# Patient Record
Sex: FEMALE | Race: WHITE | NOT HISPANIC OR LATINO | Employment: STUDENT | URBAN - METROPOLITAN AREA
[De-identification: names, ages, dates, MRNs, and addresses within clinical notes are randomized per-mention and may not be internally consistent; named-entity substitution may affect disease eponyms.]

---
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
## 2021-01-25 NOTE — TELEPHONE ENCOUNTER
Received a request from [id number] in [place], they need a new script ASAP  Please fax to [phone]  No. HELEN screening performed.  STOP BANG Legend: 0-2 = LOW Risk; 3-4 = INTERMEDIATE Risk; 5-8 = HIGH Risk

## 2021-05-10 ENCOUNTER — TELEPHONE (OUTPATIENT)
Dept: PEDIATRICS CLINIC | Age: 21
End: 2021-05-10

## 2021-05-10 NOTE — TELEPHONE ENCOUNTER
Please remove dr Dori Hernandez as [facility] Medina pcp  She now goes to see dr Coral Reeder  Thank you!

## 2021-06-03 NOTE — TELEPHONE ENCOUNTER
06/03/21 9:45 AM     Thank you for your request  Your request has been received, reviewed, and the patient chart updated  The PCP has successfully been removed with a patient attribution note  This message will now be completed      Thank you  Lacy Up

## 2022-04-30 ENCOUNTER — APPOINTMENT (EMERGENCY)
Dept: CT IMAGING | Facility: HOSPITAL | Age: 22
End: 2022-04-30
Payer: COMMERCIAL

## 2022-04-30 ENCOUNTER — HOSPITAL ENCOUNTER (EMERGENCY)
Facility: HOSPITAL | Age: 22
Discharge: HOME | End: 2022-04-30
Attending: EMERGENCY MEDICINE | Admitting: EMERGENCY MEDICINE
Payer: COMMERCIAL

## 2022-04-30 VITALS
OXYGEN SATURATION: 98 % | SYSTOLIC BLOOD PRESSURE: 123 MMHG | RESPIRATION RATE: 12 BRPM | BODY MASS INDEX: 27.42 KG/M2 | HEART RATE: 84 BPM | DIASTOLIC BLOOD PRESSURE: 73 MMHG | WEIGHT: 149.91 LBS | TEMPERATURE: 97.8 F

## 2022-04-30 DIAGNOSIS — R51.9 HEADACHE: Primary | ICD-10-CM

## 2022-04-30 PROCEDURE — 70450 CT HEAD/BRAIN W/O DYE: CPT

## 2022-04-30 PROCEDURE — 99284 EMERGENCY DEPT VISIT MOD MDM: CPT | Performed by: EMERGENCY MEDICINE

## 2022-04-30 PROCEDURE — 99284 EMERGENCY DEPT VISIT MOD MDM: CPT

## 2022-04-30 RX ORDER — KETOROLAC TROMETHAMINE 30 MG/ML
15 INJECTION, SOLUTION INTRAMUSCULAR; INTRAVENOUS ONCE
Status: DISCONTINUED | OUTPATIENT
Start: 2022-04-30 | End: 2022-04-30 | Stop reason: HOSPADM

## 2022-04-30 RX ORDER — DIPHENHYDRAMINE HYDROCHLORIDE 50 MG/ML
25 INJECTION INTRAMUSCULAR; INTRAVENOUS ONCE
Status: DISCONTINUED | OUTPATIENT
Start: 2022-04-30 | End: 2022-04-30 | Stop reason: HOSPADM

## 2022-04-30 RX ORDER — ACETAMINOPHEN 325 MG/1
975 TABLET ORAL ONCE
Status: DISCONTINUED | OUTPATIENT
Start: 2022-04-30 | End: 2022-04-30 | Stop reason: HOSPADM

## 2022-04-30 RX ORDER — METOCLOPRAMIDE HYDROCHLORIDE 5 MG/ML
10 INJECTION INTRAMUSCULAR; INTRAVENOUS ONCE
Status: DISCONTINUED | OUTPATIENT
Start: 2022-04-30 | End: 2022-04-30 | Stop reason: HOSPADM

## 2022-04-30 NOTE — Clinical Note
Jayro Maldonado was seen and treated in our emergency department on 4/30/2022  Diagnosis:     Julio Snellen  may return to work on return date  She may return on this date: 05/02/2022         If you have any questions or concerns, please don't hesitate to call        Jacques Marcus DO    ______________________________           _______________          _______________  Hospital Representative                              Date                                Time

## 2022-04-30 NOTE — ED PROVIDER NOTES
History  Chief Complaint   Patient presents with    Headache     pt complains of bump on head x 2 days with headache x 2 days  seen at urgent care and sent to ED     Patient is a 41-year-old female past medical history of asthma, anxiety, ASD status post repair presenting with headache  Patient states 2 days ago she woke up with a bump to the front of her head and since that time has had frontal headache which radiates the temples and is throbbing in nature, associated with nausea, photophobia, phonophobia and states she has not taken any medication for it  She states that the lump was worse yesterday but today it looks like a pimple therefore feels that the lump is improving but headache is unchanged  She notes lightheadedness and nausea and states that the vision seems blurred at times  She has never had headaches like this before  She denies any head injuries, vomiting, fevers, rashes, dysuria, numbness or tingling, weakness, chest pain, shortness of breath  Prior to Admission Medications   Prescriptions Last Dose Informant Patient Reported? Taking?    albuterol (PROVENTIL HFA,VENTOLIN HFA) 90 mcg/act inhaler  Self No No   Sig: Inhale 2 puffs every 4 (four) hours as needed for wheezing or shortness of breath   butalbital-acetaminophen-caffeine (FIORICET,ESGIC) -40 mg per tablet   No No   Sig: Take 1 tablet by mouth every 4 (four) hours as needed for headaches   gabapentin (NEURONTIN) 100 mg capsule   No No   Sig: Take 1 capsule (100 mg total) by mouth daily at bedtime   hydrOXYzine HCL (ATARAX) 25 mg tablet   No No   Sig: Take 1 tablet (25 mg total) by mouth as needed for anxiety for up to 15 days Not to exceed 400mg a day   ibuprofen (MOTRIN) 600 mg tablet  Self No No   Sig: Take 1 tablet (600 mg total) by mouth every 6 (six) hours as needed for moderate pain   Patient not taking: Reported on 1/23/2020   naproxen (NAPROSYN) 500 mg tablet   No No   Sig: Take 1 tablet (500 mg total) by mouth 2 (two) times a day with meals   norethindrone-ethinyl estradiol-iron (ESTROSTEP FE) 1-20/1-30/1-35 MG-MCG TABS   Yes No   Sig: Take by mouth daily   norethindrone-ethinyl estradiol-iron (LOESTRIN FE 1 5/30) 1 5-30 MG-MCG tablet  Self No No   Sig: Take 1 tablet by mouth daily      Facility-Administered Medications: None       Past Medical History:   Diagnosis Date    Acute left otitis media     Last Assessed: 5/19/2015    Allergic rhinitis     Anxiety     Asthma     Asthma     Atrial septal defect     Excessive cerumen in ear canal, left     Head injury     Heartburn     Idiopathic scoliosis of lumbar region     Influenza-like illness 01/20/2020    Irregular menstrual cycle     Nausea & vomiting     Patent foramen ovale     Pneumonia     Stress     Tonsillitis        Past Surgical History:   Procedure Laterality Date    ADENOIDECTOMY      ASD REPAIR      CARDIAC SURGERY      TONSILLECTOMY         Family History   Problem Relation Age of Onset    Migraines Mother     Asthma Father     Allergic rhinitis Father     Learning disabilities Father     Hypertension Maternal Grandmother     Hyperlipidemia Maternal Grandmother     No Known Problems Maternal Grandfather     Diabetes Paternal Grandmother     Hypertension Paternal Grandmother     Gout Paternal Grandmother     Brain cancer Paternal Grandfather         age 72    Mental illness Family     Alcohol abuse Family         No family hx substance abuse    MADDI disease Family     Constipation Brother     Learning disabilities Brother     Otitis media Brother     Allergic rhinitis Brother     Scoliosis Brother      I have reviewed and agree with the history as documented      E-Cigarette/Vaping     E-Cigarette/Vaping Substances     Social History     Tobacco Use    Smoking status: Never Smoker    Smokeless tobacco: Never Used   Substance Use Topics    Alcohol use: No    Drug use: No       Review of Systems   All other systems reviewed and are negative  Physical Exam  Physical Exam  Vitals reviewed  Constitutional:       General: She is not in acute distress  Appearance: Normal appearance  She is not ill-appearing  HENT:      Mouth/Throat:      Mouth: Mucous membranes are moist    Eyes:      Extraocular Movements: Extraocular movements intact  Conjunctiva/sclera: Conjunctivae normal       Pupils: Pupils are equal, round, and reactive to light  Cardiovascular:      Rate and Rhythm: Normal rate and regular rhythm  Heart sounds: Normal heart sounds  Pulmonary:      Effort: Pulmonary effort is normal       Breath sounds: Normal breath sounds  Abdominal:      General: Abdomen is flat  Palpations: Abdomen is soft  Tenderness: There is no abdominal tenderness  Musculoskeletal:         General: No swelling  Normal range of motion  Cervical back: Neck supple  Skin:     General: Skin is warm and dry  Neurological:      General: No focal deficit present  Mental Status: She is alert  Cranial Nerves: No cranial nerve deficit  Sensory: No sensory deficit  Motor: No weakness        Coordination: Coordination normal       Gait: Gait normal    Psychiatric:         Mood and Affect: Mood normal          Vital Signs  ED Triage Vitals [04/30/22 1116]   Temperature Pulse Respirations Blood Pressure SpO2   97 8 °F (36 6 °C) 84 12 123/73 98 %      Temp Source Heart Rate Source Patient Position - Orthostatic VS BP Location FiO2 (%)   Oral Monitor Sitting Left arm --      Pain Score       6           Vitals:    04/30/22 1116   BP: 123/73   Pulse: 84   Patient Position - Orthostatic VS: Sitting         Visual Acuity      ED Medications  Medications   sodium chloride 0 9 % bolus 1,000 mL (has no administration in time range)   ketorolac (TORADOL) injection 15 mg (has no administration in time range)   acetaminophen (TYLENOL) tablet 975 mg (has no administration in time range)   metoclopramide (REGLAN) injection 10 mg (has no administration in time range)   diphenhydrAMINE (BENADRYL) injection 25 mg (has no administration in time range)       Diagnostic Studies  Results Reviewed     Procedure Component Value Units Date/Time    hCG, qualitative pregnancy [[id number]]     Lab Status: No result Specimen: Blood                  CT head without contrast    (Results Pending)              Procedures  Procedures         ED Course  ED Course as of 04/30/22 1948   Sat Apr 30, 2022   1323 Patient has removed her IV and states she would like to go home because she is tired  Denies any symptoms consistent with akathisia but states that her headache is not resolved but does not want any further medication  Patient is willing to stay for read of CT head  MDM  Number of Diagnoses or Management Options  Diagnosis management comments: Patient is a 28-year-old female past medical history of asthma, anxiety presenting with likely migraine headache  Patient is well-appearing bedside with stable vitals and in no acute distress  She has no gross abnormalities on neurologic exam is ambulatory bedside with steady gait  Patient has acne/black to the forehead with no surrounding erythema, minimal edema, no fluctuance or induration or significant tenderness  Based on patient's description feel that she is likely suffering 1st migraine however will obtain CT head as she has no history of such  Do not feel that skin finding is related and do not feel that she requires antibiotics  Disposition  Final diagnoses:   None     ED Disposition     None      Follow-up Information    None         Patient's Medications   Discharge Prescriptions    No medications on file       No discharge procedures on file      PDMP Review     None          ED Provider  Electronically Signed by           Michelle Mahan DO  04/30/22 1948

## 2022-04-30 NOTE — ED NOTES
Patient pulled out IV as it was being inserted and started crying   Stating "I just want to go home" provider made aware     Ely Mccloud RN  04/30/22 1636

## 2024-08-30 DIAGNOSIS — Z00.6 ENCOUNTER FOR EXAMINATION FOR NORMAL COMPARISON OR CONTROL IN CLINICAL RESEARCH PROGRAM: ICD-10-CM

## 2025-01-24 ENCOUNTER — APPOINTMENT (OUTPATIENT)
Dept: LAB | Facility: CLINIC | Age: 25
End: 2025-01-24
Payer: COMMERCIAL

## 2025-01-24 DIAGNOSIS — Z00.6 ENCOUNTER FOR EXAMINATION FOR NORMAL COMPARISON OR CONTROL IN CLINICAL RESEARCH PROGRAM: ICD-10-CM

## 2025-01-24 PROCEDURE — 36415 COLL VENOUS BLD VENIPUNCTURE: CPT

## 2025-02-13 LAB
APOB+LDLR+PCSK9 GENE MUT ANL BLD/T: NOT DETECTED
BRCA1+BRCA2 DEL+DUP + FULL MUT ANL BLD/T: NOT DETECTED
MLH1+MSH2+MSH6+PMS2 GN DEL+DUP+FUL M: NOT DETECTED

## 2025-03-11 ENCOUNTER — OFFICE VISIT (OUTPATIENT)
Dept: URGENT CARE | Facility: CLINIC | Age: 25
End: 2025-03-11
Payer: COMMERCIAL

## 2025-03-11 VITALS
SYSTOLIC BLOOD PRESSURE: 123 MMHG | HEART RATE: 95 BPM | DIASTOLIC BLOOD PRESSURE: 86 MMHG | OXYGEN SATURATION: 98 % | RESPIRATION RATE: 18 BRPM | TEMPERATURE: 98.1 F

## 2025-03-11 DIAGNOSIS — J40 BRONCHITIS: Primary | ICD-10-CM

## 2025-03-11 PROCEDURE — 99213 OFFICE O/P EST LOW 20 MIN: CPT | Performed by: PHYSICAL MEDICINE & REHABILITATION

## 2025-03-11 PROCEDURE — S9083 URGENT CARE CENTER GLOBAL: HCPCS | Performed by: PHYSICAL MEDICINE & REHABILITATION

## 2025-03-11 RX ORDER — AZITHROMYCIN 250 MG/1
TABLET, FILM COATED ORAL
Qty: 6 TABLET | Refills: 0 | Status: SHIPPED | OUTPATIENT
Start: 2025-03-11 | End: 2025-03-15

## 2025-03-11 RX ORDER — METFORMIN HYDROCHLORIDE 500 MG/1
1 TABLET, EXTENDED RELEASE ORAL
COMMUNITY

## 2025-03-11 RX ORDER — NORGESTIMATE AND ETHINYL ESTRADIOL 7DAYSX3 28
1 KIT ORAL DAILY
COMMUNITY

## 2025-03-11 RX ORDER — ALBUTEROL SULFATE 90 UG/1
2 INHALANT RESPIRATORY (INHALATION) EVERY 6 HOURS PRN
Qty: 8.5 G | Refills: 0 | Status: SHIPPED | OUTPATIENT
Start: 2025-03-11

## 2025-03-11 RX ORDER — METHYLPREDNISOLONE 4 MG/1
TABLET ORAL
Qty: 1 EACH | Refills: 0 | Status: SHIPPED | OUTPATIENT
Start: 2025-03-11

## 2025-03-11 RX ORDER — NORGESTIMATE AND ETHINYL ESTRADIOL 7DAYSX3 LO
KIT ORAL
COMMUNITY

## 2025-03-11 NOTE — PROGRESS NOTES
[facility]        NAME: Ivet Lux is a 24 y.o. female  : 2000    MRN: [id number]  DATE: 2025  TIME: 9:11 AM    Assessment and Plan   Bronchitis [J40]  1. Bronchitis  azithromycin (ZITHROMAX) 250 mg tablet    albuterol (ProAir HFA) 90 mcg/act inhaler    methylPREDNISolone 4 MG tablet therapy pack            Patient Instructions       Follow up with PCP in 3-5 days.  Proceed to  ER if symptoms worsen.    If tests are performed, our office will contact you with results only if changes need to made to the care plan discussed with you at the visit. You can review your full results on [facility].    Chief Complaint     Chief Complaint   Patient presents with    Cough     Cough on and off for 2 weeks. Unknown fevers recently. Has hx of asthma. Has not been using neb treatments due to neb machine being broken.          History of Present Illness       Pt is a 24 year old female presenting with a cough on and off for approximately 2 weeks. She does note a hx of asthma. She is out of her albuterol inhaler and her nebulizer machine is broken.     Cough        Review of Systems   Review of Systems   Constitutional: Negative.    HENT: Negative.     Respiratory:  Positive for cough.    Cardiovascular: Negative.    Gastrointestinal: Negative.    Musculoskeletal: Negative.          Current Medications       Current Outpatient Medications:     albuterol (ProAir HFA) 90 mcg/act inhaler, Inhale 2 puffs every 6 (six) hours as needed for wheezing, Disp: 8.5 g, Rfl: 0    azithromycin (ZITHROMAX) 250 mg tablet, Take 2 tablets today then 1 tablet daily x 4 days, Disp: 6 tablet, Rfl: 0    metFORMIN (GLUCOPHAGE) 1000 MG tablet, Take 1,000 mg by mouth, Disp: , Rfl:     methylPREDNISolone 4 MG tablet therapy pack, Use as directed on package, Disp: 1 each, Rfl: 0    Tri-Sprintec 0.18/0.215/0.25 MG-35 MCG per tablet, Take 1 tablet by mouth daily, Disp: , Rfl:     albuterol (PROVENTIL HFA,VENTOLIN HFA)  90 mcg/act inhaler, Inhale 2 puffs every 4 (four) hours as needed for wheezing or shortness of breath (Patient not taking: Reported on 3/11/2025), Disp: 18 g, Rfl: 1    butalbital-acetaminophen-caffeine (FIORICET,ESGIC) -40 mg per tablet, Take 1 tablet by mouth every 4 (four) hours as needed for headaches (Patient not taking: Reported on 3/11/2025), Disp: 30 tablet, Rfl: 0    gabapentin (NEURONTIN) 100 mg capsule, Take 1 capsule (100 mg total) by mouth daily at bedtime (Patient not taking: Reported on 3/11/2025), Disp: 30 capsule, Rfl: 0    hydrOXYzine HCL (ATARAX) 25 mg tablet, Take 1 tablet (25 mg total) by mouth as needed for anxiety for up to 15 days Not to exceed 400mg a day (Patient not taking: Reported on 3/11/2025), Disp: 15 tablet, Rfl: 0    ibuprofen (MOTRIN) 600 mg tablet, Take 1 tablet (600 mg total) by mouth every 6 (six) hours as needed for moderate pain (Patient not taking: Reported on 1/23/2020), Disp: 20 tablet, Rfl: 0    metFORMIN (GLUCOPHAGE-XR) 500 mg 24 hr tablet, Take 1 tablet by mouth daily with breakfast (Patient not taking: Reported on 3/11/2025), Disp: , Rfl:     naproxen (NAPROSYN) 500 mg tablet, Take 1 tablet (500 mg total) by mouth 2 (two) times a day with meals (Patient not taking: Reported on 3/11/2025), Disp: 30 tablet, Rfl: 0    norethindrone-ethinyl estradiol-iron (ESTROSTEP FE) 1-20/1-30/1-35 MG-MCG TABS, Take by mouth daily, Disp: , Rfl:     norethindrone-ethinyl estradiol-iron (LOESTRIN FE 1.5/30) 1.5-30 MG-MCG tablet, Take 1 tablet by mouth daily, Disp: 84 tablet, Rfl: 1    Norgestimate-Eth Estradiol (Ortho Tri-Cyclen Lo) 0.18/0.215/0.25 MG-25 MCG TABS, , Disp: , Rfl:     Current Allergies     Allergies as of 03/11/2025 - Reviewed 03/11/2025   Allergen Reaction Noted    Pollen extract Other (See Comments) 05/15/2024            The following portions of the patient's history were reviewed and updated as appropriate: allergies, current medications, past family history, past  medical history, past social history, past surgical history and problem list.     Past Medical History:   Diagnosis Date    Acute left otitis media     Last Assessed: 5/19/2015    Allergic rhinitis     Anxiety     Asthma     Asthma     Atrial septal defect     Excessive cerumen in ear canal, left     Head injury     Heartburn     Idiopathic scoliosis of lumbar region     Influenza-like illness 01/20/2020    Irregular menstrual cycle     Nausea & vomiting     Patent foramen ovale     Pneumonia     Stress     Tonsillitis        Past Surgical History:   Procedure Laterality Date    ADENOIDECTOMY      ASD REPAIR      CARDIAC SURGERY      TONSILLECTOMY         Family History   Problem Relation Age of Onset    Migraines Mother     Asthma Father     Allergic rhinitis Father     Learning disabilities Father     Hypertension Maternal Grandmother     Hyperlipidemia Maternal Grandmother     No Known Problems Maternal Grandfather     Diabetes Paternal Grandmother     Hypertension Paternal Grandmother     Gout Paternal Grandmother     Brain cancer Paternal Grandfather         age 65    Mental illness Family     Alcohol abuse Family         No family hx substance abuse    MADDI disease Family     Constipation Brother     Learning disabilities Brother     Otitis media Brother     Allergic rhinitis Brother     Scoliosis Brother          Medications have been verified.        Objective   /86   Pulse 95   Temp 98.1 °F (36.7 °C)   Resp 18   SpO2 98%        Physical Exam     Physical Exam  Constitutional:       General: She is not in acute distress.     Appearance: She is ill-appearing.   HENT:      Right Ear: Tympanic membrane normal.      Left Ear: Tympanic membrane normal.      Nose: Rhinorrhea present. No congestion.      Mouth/Throat:      Mouth: Mucous membranes are moist.      Pharynx: Oropharynx is clear. No oropharyngeal exudate or posterior oropharyngeal erythema.   Eyes:      Conjunctiva/sclera: Conjunctivae normal.    Cardiovascular:      Rate and Rhythm: Normal rate and regular rhythm.      Heart sounds: Normal heart sounds.   Pulmonary:      Effort: Pulmonary effort is normal. No respiratory distress.      Breath sounds: Decreased breath sounds present. No wheezing, rhonchi or rales.   Musculoskeletal:      Cervical back: Normal range of motion and neck supple.   Lymphadenopathy:      Cervical: No cervical adenopathy.   Skin:     General: Skin is warm.   Neurological:      Mental Status: She is alert.   Psychiatric:         Mood and Affect: Mood normal.         Behavior: Behavior normal.

## 2025-07-13 ENCOUNTER — HOSPITAL ENCOUNTER (EMERGENCY)
Facility: HOSPITAL | Age: 25
Discharge: HOME | End: 2025-07-14
Attending: EMERGENCY MEDICINE
Payer: COMMERCIAL

## 2025-07-13 DIAGNOSIS — R51.9 HEADACHE: ICD-10-CM

## 2025-07-13 DIAGNOSIS — R10.9 ABDOMINAL PAIN: Primary | ICD-10-CM

## 2025-07-13 PROCEDURE — 99284 EMERGENCY DEPT VISIT MOD MDM: CPT

## 2025-07-13 PROCEDURE — 99284 EMERGENCY DEPT VISIT MOD MDM: CPT | Performed by: EMERGENCY MEDICINE

## 2025-07-14 VITALS
OXYGEN SATURATION: 100 % | TEMPERATURE: 97.4 F | HEIGHT: 63 IN | DIASTOLIC BLOOD PRESSURE: 80 MMHG | BODY MASS INDEX: 34.55 KG/M2 | HEART RATE: 96 BPM | SYSTOLIC BLOOD PRESSURE: 117 MMHG | WEIGHT: 195 LBS | RESPIRATION RATE: 16 BRPM

## 2025-07-14 LAB
ALBUMIN SERPL BCG-MCNC: 4.5 G/DL (ref 3.5–5)
ALP SERPL-CCNC: 107 U/L (ref 34–104)
ALT SERPL W P-5'-P-CCNC: 47 U/L (ref 7–52)
ANION GAP SERPL CALCULATED.3IONS-SCNC: 9 MMOL/L (ref 4–13)
AST SERPL W P-5'-P-CCNC: 38 U/L (ref 13–39)
BASOPHILS # BLD AUTO: 0.06 THOUSANDS/ÂΜL (ref 0–0.1)
BASOPHILS NFR BLD AUTO: 1 % (ref 0–1)
BILIRUB SERPL-MCNC: 0.37 MG/DL (ref 0.2–1)
BUN SERPL-MCNC: 6 MG/DL (ref 5–25)
CALCIUM SERPL-MCNC: 9.4 MG/DL (ref 8.4–10.2)
CHLORIDE SERPL-SCNC: 102 MMOL/L (ref 96–108)
CO2 SERPL-SCNC: 27 MMOL/L (ref 21–32)
CREAT SERPL-MCNC: 0.6 MG/DL (ref 0.6–1.3)
EOSINOPHIL # BLD AUTO: 0.1 THOUSAND/ÂΜL (ref 0–0.61)
EOSINOPHIL NFR BLD AUTO: 1 % (ref 0–6)
ERYTHROCYTE [DISTWIDTH] IN BLOOD BY AUTOMATED COUNT: 13 % (ref 11.6–15.1)
GFR SERPL CREATININE-BSD FRML MDRD: 128 ML/MIN/1.73SQ M
GLUCOSE SERPL-MCNC: 93 MG/DL (ref 65–140)
HCG SERPL QL: NEGATIVE
HCT VFR BLD AUTO: 45.9 % (ref 34.8–46.1)
HGB BLD-MCNC: 15.2 G/DL (ref 11.5–15.4)
IMM GRANULOCYTES # BLD AUTO: 0.02 THOUSAND/UL (ref 0–0.2)
IMM GRANULOCYTES NFR BLD AUTO: 0 % (ref 0–2)
LIPASE SERPL-CCNC: 31 U/L (ref 11–82)
LYMPHOCYTES # BLD AUTO: 2.75 THOUSANDS/ÂΜL (ref 0.6–4.47)
LYMPHOCYTES NFR BLD AUTO: 32 % (ref 14–44)
MCH RBC QN AUTO: 27.9 PG (ref 26.8–34.3)
MCHC RBC AUTO-ENTMCNC: 33.1 G/DL (ref 31.4–37.4)
MCV RBC AUTO: 84 FL (ref 82–98)
MONOCYTES # BLD AUTO: 0.69 THOUSAND/ÂΜL (ref 0.17–1.22)
MONOCYTES NFR BLD AUTO: 8 % (ref 4–12)
NEUTROPHILS # BLD AUTO: 5.05 THOUSANDS/ÂΜL (ref 1.85–7.62)
NEUTS SEG NFR BLD AUTO: 58 % (ref 43–75)
NRBC BLD AUTO-RTO: 0 /100 WBCS
PLATELET # BLD AUTO: 440 THOUSANDS/UL (ref 149–390)
PMV BLD AUTO: 9.2 FL (ref 8.9–12.7)
POTASSIUM SERPL-SCNC: 3.5 MMOL/L (ref 3.5–5.3)
PROT SERPL-MCNC: 7.8 G/DL (ref 6.4–8.4)
RBC # BLD AUTO: 5.45 MILLION/UL (ref 3.81–5.12)
SODIUM SERPL-SCNC: 138 MMOL/L (ref 135–147)
WBC # BLD AUTO: 8.67 THOUSAND/UL (ref 4.31–10.16)

## 2025-07-14 PROCEDURE — 80053 COMPREHEN METABOLIC PANEL: CPT | Performed by: EMERGENCY MEDICINE

## 2025-07-14 PROCEDURE — 85025 COMPLETE CBC W/AUTO DIFF WBC: CPT | Performed by: EMERGENCY MEDICINE

## 2025-07-14 PROCEDURE — 96375 TX/PRO/DX INJ NEW DRUG ADDON: CPT

## 2025-07-14 PROCEDURE — 84703 CHORIONIC GONADOTROPIN ASSAY: CPT | Performed by: EMERGENCY MEDICINE

## 2025-07-14 PROCEDURE — 96361 HYDRATE IV INFUSION ADD-ON: CPT

## 2025-07-14 PROCEDURE — 36415 COLL VENOUS BLD VENIPUNCTURE: CPT | Performed by: EMERGENCY MEDICINE

## 2025-07-14 PROCEDURE — 96374 THER/PROPH/DIAG INJ IV PUSH: CPT

## 2025-07-14 PROCEDURE — 83690 ASSAY OF LIPASE: CPT | Performed by: EMERGENCY MEDICINE

## 2025-07-14 RX ORDER — METOCLOPRAMIDE HYDROCHLORIDE 5 MG/ML
10 INJECTION INTRAMUSCULAR; INTRAVENOUS ONCE
Status: COMPLETED | OUTPATIENT
Start: 2025-07-14 | End: 2025-07-14

## 2025-07-14 RX ORDER — DIPHENHYDRAMINE HYDROCHLORIDE 50 MG/ML
25 INJECTION, SOLUTION INTRAMUSCULAR; INTRAVENOUS ONCE
Status: COMPLETED | OUTPATIENT
Start: 2025-07-14 | End: 2025-07-14

## 2025-07-14 RX ADMIN — METOCLOPRAMIDE 10 MG: 5 INJECTION, SOLUTION INTRAMUSCULAR; INTRAVENOUS at 00:17

## 2025-07-14 RX ADMIN — DIPHENHYDRAMINE HYDROCHLORIDE 25 MG: 50 INJECTION, SOLUTION INTRAMUSCULAR; INTRAVENOUS at 00:17

## 2025-07-14 RX ADMIN — SODIUM CHLORIDE 1000 ML: 0.9 INJECTION, SOLUTION INTRAVENOUS at 00:16

## 2025-07-14 NOTE — ED PROVIDER NOTES
"Time reflects when diagnosis was documented in both MDM as applicable and the Disposition within this note       Time User Action Codes Description Comment    7/14/2025  3:07 AM Arturo Alejandre Add [R10.9] Abdominal pain     7/14/2025  3:07 AM Arturo Alejandre Add [R51.9] Headache           ED Disposition       ED Disposition   Discharge    Condition   Stable    Date/Time   Mon Jul 14, 2025  3:07 AM    Comment   Ivet Lux discharge to home/self care.                   Assessment & Plan       Medical Decision Making  24-year-old female presents to the emergency room with a chief complaint of \"my stomach hurts and I have a headache.\"    Patient states \"I missed my period last month\" though she states she has taken multiple pregnancy tests that have been negative since that time.  Patient was previously on OCPs but has not taken them for over a month.    Patient states her headache and abdominal pain have been ongoing since awakening this morning.  Patient states her headache has worsened since onset but currently has improved.  Patient notes right-sided    Patient affirms nausea but denies vomiting.    Patient denies any fever or chills.   Patient denies any visual changes.  Patient denies any sensory changes.    Patient denies any focal weakness.    Patient denies diarrhea.  Patient denies urinary symptoms.  Patient denies vaginal bleeding or discharge.    Patient denies any concerns for CO exposure.  Patient denies any recent tick bites.  Patient denies any recent cervical manipulation  Patient denies any recent trauma.    Patient denies any history of connective tissue disorders.    Patient denies any history or family history of SAH.    Patient denies any history of immunocompromised state.    Patient denies any use of illicit drugs.    Patient denies any recent use of antibiotics, international travel, or trauma.    Patient notes history of no prior surgery.      Focused Objective Exam:  Eyes:  PERRL, EOMI.  " Normal fundoscopic exam with no signs of papilledema or retinal hemorrhage.  No nystagmus with gaze fixation.  HENT: Atraumatic.  Pharynx moist and non-erythematous.  No tenderness or swelling over the temporal arteries.  Neck: no carotid bruit, no tenderness to palpitation.  Able to touch chin to chest.  Negative jolt accentuation testing.  Abdomen:  Inspection of an abdomen without previous abdominal surgical incisions noted without erythema, rashes or ecchymosis noted.  No abdominal pulsations noted.  Normal bowel sounds with no bruit auscultated.  Soft abdomen.  Palpitation noted no tenderness to palpitation; tenderness not over McBurney's point.  No masses or pulsatile aorta noted on examination.  No rebound or guarding noted on examination, non-peritoneal exam.    Back:  No rash or signs of herpes zoster.  No CVA tenderness noted.   Skin:  No ecchymosis of the umbilicus (negative Mlavin's sign) or flank (negative Par[place] Christian's sign). Warm and dry.  Neuro:  Alert and answers questions appropriately.  No dysarthria.  Identifies common objects appropriately.  Normal tandem gait, including toe walking and heel walking.  Normal Romberg exam.  No pronator drift.  Normal finger to nose.  Normal fine motor function with rapid finger movements.  Normal hand tap.  Cranial nerves II through XII grossly intact.  Visual fields grossly intact. Upper and lower motor strength 5/5 and symmetric.  Normal light touch sensory exam.   Normal DTRs.     Medical Decision Making    Abdominal pain and a headache representing  a broad differential.  Will establish IV access and make patient NPO considering possibility of surgical intervention required.  Will initiate symptomatic management including intravenous fluids.    Will obtain qualitative pregnancy testing to identify possible alternative diagnoses and etiologies of patient's pain.    Patient states she has taken at home pregnancy tests which were negative but will reassess this  as this will significantly alter patient's potential etiologies of her symptoms.  Regarding patient's headache, currently she has a normal neurologic exam with mild headache on the right side.  Patient states she felt dizziness prior but has none at present and is ambulating without difficulty.  Patient is not currently on estrogen containing products so she has no risk factors for CVT.  Discussed risks and benefits of imaging with the patient and will reassess after symptomatic management is completed regarding this.  Patient denies any history or risk factors for subarachnoid hemorrhage and she has negative [place] subarachnoid rule.  No indications for imaging regarding this.  Patient denies any fever and has no meningeal signs on clinical examination.    Regarding patient's abdominal pain, she notes periumbilical pain but has no tenderness to palpation including over McBurney's point.  Symptoms seem less consistent with appendicitis based on clinical examination.  Will obtain laboratory evaluation including CBC to evaluate for anemia and leukocytosis.  Will obtain CMP to evaluate electrolytes, renal, biliary, and hepatic function.  Will obtain lipase to evaluate for potential pancreatitis.  Will monitor patient and reassess regarding the need for imaging after initial evaluation is completed and symptomatic management has completed.    Will monitor and reassess.    Amount and/or Complexity of Data Reviewed  Labs: ordered.    Risk  Prescription drug management.        ED Course as of 07/20/25 2243   Mon Jul 14, 2025   0308 Patient symptoms have resolved.  Patient's laboratory assessment is reassuring.  I discussed the findings with the patient.  I discussed the need for continued follow-up considering the diagnostic uncertainty.  Considering patient's symptoms have resolved at present, discussed imaging with the patient and will defer and patient will return to the emergency room with any worsening symptoms.   "Discussed and emphasized the need for this.  Discussed return precautions in detail.       Medications   metoclopramide (REGLAN) injection 10 mg (10 mg Intravenous Given 7/14/25 0017)   diphenhydrAMINE (BENADRYL) injection 25 mg (25 mg Intravenous Given 7/14/25 0017)   sodium chloride 0.9 % bolus 1,000 mL (0 mL Intravenous Stopped 7/14/25 0116)       ED Risk Strat Scores                    No data recorded        SBIRT 20yo+      Flowsheet Row Most Recent Value   Initial Alcohol Screen: US AUDIT-C     1. How often do you have a drink containing alcohol? 0 Filed at: 07/13/2025 2313   2. How many drinks containing alcohol do you have on a typical day you are drinking?  0 Filed at: 07/13/2025 2313   3b. FEMALE Any Age, or MALE 65+: How often do you have 4 or more drinks on one occassion? 0 Filed at: 07/13/2025 2313   Audit-C Score 0 Filed at: 07/13/2025 2313   JOSE: How many times in the past year have you...    Used an illegal drug or used a prescription medication for non-medical reasons? Never Filed at: 07/13/2025 2313                            History of Present Illness       Chief Complaint   Patient presents with    Abdominal Pain    Headache     Pt reports \"weird ABD pain\", as well as a HA today. Also states that she missed her period last month, but reports multiple negative pregnancy tests at home. Pt reports that she has PCOS and ws on birth control, but \"ran out\"       Past Medical History[1]   Past Surgical History[2]   Family History[3]   Social History[4]   E-Cigarette/Vaping    E-Cigarette Use Never User       E-Cigarette/Vaping Substances      I have reviewed and agree with the history as documented.     HPI    Review of Systems        Objective       ED Triage Vitals   Temperature Pulse Blood Pressure Respirations SpO2 Patient Position - Orthostatic VS   07/13/25 2311 07/13/25 2311 07/13/25 2311 07/13/25 2311 07/13/25 2311 --   (!) 97.4 °F (36.3 °C) (!) 115 151/98 18 99 %       Temp src Heart Rate " Source BP Location FiO2 (%) Pain Score    -- -- -- -- 07/14/25 0209        4      Vitals      Date and Time Temp Pulse SpO2 Resp BP Pain Score FACES Pain Rating User   07/14/25 0209 -- 96 100 % 16 117/80 4 -- KT   07/13/25 2312 -- 92 -- -- -- -- -- AR   07/13/25 2311 97.4 °F (36.3 °C) 115 99 % 18 151/98 -- -- AR            Physical Exam    Results Reviewed       Procedure Component Value Units Date/Time    hCG, qualitative pregnancy [[id number]]  (Normal) Collected: 07/14/25 0016    Lab Status: Final result Specimen: Blood from Arm, Right Updated: 07/14/25 0052     Preg, Serum Negative    CMP [[id number]]  (Abnormal) Collected: 07/14/25 0016    Lab Status: Final result Specimen: Blood from Arm, Right Updated: 07/14/25 0047     Sodium 138 mmol/L      Potassium 3.5 mmol/L      Chloride 102 mmol/L      CO2 27 mmol/L      ANION GAP 9 mmol/L      BUN 6 mg/dL      Creatinine 0.60 mg/dL      Glucose 93 mg/dL      Calcium 9.4 mg/dL      AST 38 U/L      ALT 47 U/L      Alkaline Phosphatase 107 U/L      Total Protein 7.8 g/dL      Albumin 4.5 g/dL      Total Bilirubin 0.37 mg/dL      eGFR 128 ml/min/1.73sq m     Narrative:      National Kid[employer] Foundation guidelines for Chronic Kidney Disease (CKD):     Stage 1 with normal or high GFR (GFR > 90 mL/min/1.73 square meters)    Stage 2 Mild CKD (GFR = 60-89 mL/min/1.73 square meters)    Stage 3A Moderate CKD (GFR = 45-59 mL/min/1.73 square meters)    Stage 3B Moderate CKD (GFR = 30-44 mL/min/1.73 square meters)    Stage 4 Severe CKD (GFR = 15-29 mL/min/1.73 square meters)    Stage 5 End Stage CKD (GFR <15 mL/min/1.73 square meters)  Note: GFR calculation is accurate only with a steady state creatinine    Lipase [[id number]]  (Normal) Collected: 07/14/25 0016    Lab Status: Final result Specimen: Blood from Arm, Right Updated: 07/14/25 0047     Lipase 31 u/L     CBC and differential [[id number]]  (Abnormal) Collected: 07/14/25 0016    Lab Status: Final result Specimen: Blood  from Arm, Right Updated: 07/14/25 0029     WBC 8.67 Thousand/uL      RBC 5.45 Million/uL      Hemoglobin 15.2 g/dL      Hematocrit 45.9 %      MCV 84 fL      MCH 27.9 pg      MCHC 33.1 g/dL      RDW 13.0 %      MPV 9.2 fL      Platelets 440 Thousands/uL      nRBC 0 /100 WBCs      Segmented % 58 %      Immature Grans % 0 %      Lymphocytes % 32 %      Monocytes % 8 %      Eosinophils Relative 1 %      Basophils Relative 1 %      Absolute Neutrophils 5.05 Thousands/µL      Absolute Immature Grans 0.02 Thousand/uL      Absolute Lymphocytes 2.75 Thousands/µL      Absolute Monocytes 0.69 Thousand/µL      Eosinophils Absolute 0.10 Thousand/µL      Basophils Absolute 0.06 Thousands/µL             No orders to display       Procedures    ED Medication and Procedure Management   Prior to Admission Medications   Prescriptions Last Dose Informant Patient Reported? Taking?   Norgestimate-Eth Estradiol (Ortho Tri-Cyclen Lo) 0.18/0.215/0.25 MG-25 MCG TABS   Yes No   Tri-Sprintec 0.18/0.215/0.25 MG-35 MCG per tablet   Yes No   Sig: Take 1 tablet by mouth daily   albuterol (PROVENTIL HFA,VENTOLIN HFA) 90 mcg/act inhaler  Self No No   Sig: Inhale 2 puffs every 4 (four) hours as needed for wheezing or shortness of breath   Patient not taking: Reported on 3/11/2025   albuterol (ProAir HFA) 90 mcg/act inhaler   No No   Sig: Inhale 2 puffs every 6 (six) hours as needed for wheezing   butalbital-acetaminophen-caffeine (FIORICET,ESGIC) -40 mg per tablet   No No   Sig: Take 1 tablet by mouth every 4 (four) hours as needed for headaches   Patient not taking: Reported on 3/11/2025   gabapentin (NEURONTIN) 100 mg capsule   No No   Sig: Take 1 capsule (100 mg total) by mouth daily at bedtime   Patient not taking: Reported on 3/11/2025   hydrOXYzine HCL (ATARAX) 25 mg tablet   No No   Sig: Take 1 tablet (25 mg total) by mouth as needed for anxiety for up to 15 days Not to exceed 400mg a day   Patient not taking: Reported on 3/11/2025    ibuprofen (MOTRIN) 600 mg tablet  Self No No   Sig: Take 1 tablet (600 mg total) by mouth every 6 (six) hours as needed for moderate pain   Patient not taking: Reported on 1/23/2020   metFORMIN (GLUCOPHAGE) 1000 MG tablet   Yes No   Sig: Take 1,000 mg by mouth   metFORMIN (GLUCOPHAGE-XR) 500 mg 24 hr tablet   Yes No   Sig: Take 1 tablet by mouth daily with breakfast   Patient not taking: Reported on 3/11/2025   methylPREDNISolone 4 MG tablet therapy pack   No No   Sig: Use as directed on package   naproxen (NAPROSYN) 500 mg tablet   No No   Sig: Take 1 tablet (500 mg total) by mouth 2 (two) times a day with meals   Patient not taking: Reported on 3/11/2025   norethindrone-ethinyl estradiol-iron (ESTROSTEP FE) 1-20/1-30/1-35 MG-MCG TABS   Yes No   Sig: Take by mouth daily   norethindrone-ethinyl estradiol-iron (LOESTRIN FE 1.5/30) 1.5-30 MG-MCG tablet  Self No No   Sig: Take 1 tablet by mouth daily      Facility-Administered Medications: None     Discharge Medication List as of 7/14/2025  3:08 AM        CONTINUE these medications which have NOT CHANGED    Details   !! albuterol (ProAir HFA) 90 mcg/act inhaler Inhale 2 puffs every 6 (six) hours as needed for wheezing, Starting Tue 3/11/2025, Normal      !! albuterol (PROVENTIL HFA,VENTOLIN HFA) 90 mcg/act inhaler Inhale 2 puffs every 4 (four) hours as needed for wheezing or shortness of breath, Starting Mon 9/10/2018, Normal      butalbital-acetaminophen-caffeine (FIORICET,ESGIC) -40 mg per tablet Take 1 tablet by mouth every 4 (four) hours as needed for headaches, Starting Wed 8/12/2020, Print      gabapentin (NEURONTIN) 100 mg capsule Take 1 capsule (100 mg total) by mouth daily at bedtime, Starting Tue 5/19/2020, Normal      hydrOXYzine HCL (ATARAX) 25 mg tablet Take 1 tablet (25 mg total) by mouth as needed for anxiety for up to 15 days Not to exceed 400mg a day, Starting Sun 11/15/2020, Until Mon 11/30/2020, Normal      ibuprofen (MOTRIN) 600 mg tablet  Take 1 tablet (600 mg total) by mouth every 6 (six) hours as needed for moderate pain, Starting Sun 8/4/2019, Print      metFORMIN (GLUCOPHAGE) 1000 MG tablet Take 1,000 mg by mouth, Starting Fri 11/8/2024, Until Sat 11/8/2025 at 2359, Historical Med      metFORMIN (GLUCOPHAGE-XR) 500 mg 24 hr tablet Take 1 tablet by mouth daily with breakfast, Historical Med      methylPREDNISolone 4 MG tablet therapy pack Use as directed on package, Normal      naproxen (NAPROSYN) 500 mg tablet Take 1 tablet (500 mg total) by mouth 2 (two) times a day with meals, Starting Wed 8/12/2020, Print      norethindrone-ethinyl estradiol-iron (ESTROSTEP FE) 1-20/1-30/1-35 MG-MCG TABS Take by mouth daily, Historical Med      norethindrone-ethinyl estradiol-iron (LOESTRIN FE 1.5/30) 1.5-30 MG-MCG tablet Take 1 tablet by mouth daily, Starting Thu 5/23/2019, Normal      Norgestimate-Eth Estradiol (Ortho Tri-Cyclen Lo) 0.18/0.215/0.25 MG-25 MCG TABS Historical Med      Tri-Sprintec 0.18/0.215/0.25 MG-35 MCG per tablet Take 1 tablet by mouth daily, Historical Med       !! - Potential duplicate medications found. Please discuss with provider.        No discharge procedures on file.  ED SEPSIS DOCUMENTATION   Time reflects when diagnosis was documented in both MDM as applicable and the Disposition within this note       Time User Action Codes Description Comment    7/14/2025  3:07 AM Arturo Alejandre [R10.9] Abdominal pain     7/14/2025  3:07 AM Arturo Alejandre [R51.9] Headache                      [1]   Past Medical History:  Diagnosis Date    Acute left otitis media     Last Assessed: 5/19/2015    Allergic rhinitis     Anxiety     Asthma     Asthma     Atrial septal defect     Excessive cerumen in ear canal, left     Head injury     Heartburn     Idiopathic scoliosis of lumbar region     Influenza-like illness 01/20/2020    Irregular menstrual cycle     Nausea & vomiting     Patent foramen ovale     Pneumonia     Stress     Tonsillitis    [2]    Past Surgical History:  Procedure Laterality Date    ADENOIDECTOMY      ASD REPAIR      CARDIAC SURGERY      TONSILLECTOMY     [3]   Family History  Problem Relation Name Age of Onset    Migraines Mother Yessy     Asthma Father Manny     Allergic rhinitis Father Manny     Learning disabilities Father Manny     Hypertension Maternal Grandmother      Hyperlipidemia Maternal Grandmother      No Known Problems Maternal Grandfather      Diabetes Paternal Grandmother      Hypertension Paternal Grandmother      Gout Paternal Grandmother      Brain cancer Paternal Grandfather          age 65    Mental illness Family      Alcohol abuse Family          No family hx substance abuse    MADDI disease Family      Constipation Brother Eli     Learning disabilities Brother Eli     Otitis media Brother Eli     Allergic rhinitis Brother Lim     Scoliosis Brother Lim    [4]   Social History  Tobacco Use    Smoking status: Never    Smokeless tobacco: Never   Vaping Use    Vaping status: Never Used   Substance Use Topics    Alcohol use: No    Drug use: No        Arturo Alejandre MD  07/20/25 5148